# Patient Record
Sex: FEMALE | URBAN - METROPOLITAN AREA
[De-identification: names, ages, dates, MRNs, and addresses within clinical notes are randomized per-mention and may not be internally consistent; named-entity substitution may affect disease eponyms.]

---

## 2017-10-11 ENCOUNTER — APPOINTMENT (RX ONLY)
Dept: URBAN - METROPOLITAN AREA CLINIC 17 | Facility: CLINIC | Age: 52
Setting detail: DERMATOLOGY
End: 2017-10-11

## 2017-10-11 DIAGNOSIS — L81.1 CHLOASMA: ICD-10-CM

## 2017-10-11 DIAGNOSIS — B35.1 TINEA UNGUIUM: ICD-10-CM

## 2017-10-11 DIAGNOSIS — Z71.89 OTHER SPECIFIED COUNSELING: ICD-10-CM

## 2017-10-11 DIAGNOSIS — L57.0 ACTINIC KERATOSIS: ICD-10-CM

## 2017-10-11 PROBLEM — L55.1 SUNBURN OF SECOND DEGREE: Status: ACTIVE | Noted: 2017-10-11

## 2017-10-11 PROCEDURE — ? COUNSELING

## 2017-10-11 PROCEDURE — ? DEFER

## 2017-10-11 PROCEDURE — ? PRODUCT LINE (PHARMACEUTICALS)

## 2017-10-11 PROCEDURE — ? TREATMENT REGIMEN

## 2017-10-11 PROCEDURE — ? OTHER

## 2017-10-11 PROCEDURE — 99202 OFFICE O/P NEW SF 15 MIN: CPT

## 2017-10-11 ASSESSMENT — LOCATION DETAILED DESCRIPTION DERM
LOCATION DETAILED: LEFT FOREHEAD
LOCATION DETAILED: RIGHT SUPERIOR LATERAL MALAR CHEEK
LOCATION DETAILED: RIGHT DORSAL GREAT TOE
LOCATION DETAILED: RIGHT LATERAL FOREHEAD
LOCATION DETAILED: RIGHT CENTRAL MALAR CHEEK
LOCATION DETAILED: LEFT GREAT TOENAIL

## 2017-10-11 ASSESSMENT — LOCATION SIMPLE DESCRIPTION DERM
LOCATION SIMPLE: LEFT FOREHEAD
LOCATION SIMPLE: RIGHT GREAT TOE
LOCATION SIMPLE: RIGHT FOREHEAD
LOCATION SIMPLE: RIGHT CHEEK
LOCATION SIMPLE: LEFT GREAT TOE

## 2017-10-11 ASSESSMENT — LOCATION ZONE DERM
LOCATION ZONE: TOENAIL
LOCATION ZONE: TOE
LOCATION ZONE: FACE

## 2017-10-11 NOTE — HPI: INFECTION (ONYCHOMYCOSIS)
How Severe Is It?: severe
Is This A New Presentation, Or A Follow-Up?: Nail Infection
Additional History: Pt reports using lamasil in the past.

## 2017-10-11 NOTE — PROCEDURE: PRODUCT LINE (PHARMACEUTICALS)
Product 15 Price (In Dollars - Numeric Only, No Special Characters Or $): 30.00
Product 25 Units: 0
Name Of Product 1: Clobetasol Foam
Product 44 Price (In Dollars - Numeric Only, No Special Characters Or $): 0.00
Name Of Product 8: Tretinoin 0.1 % cream
Product 8 Application Directions: Apply pea size amount to entire face 2-3 times a week working up to nightly as tolerated.
Product 15 Application Directions: Apply to arms daily for 2-3 weeks
Name Of Product 15: Actinic Keratosis gel
Product 9 Application Directions: Apply to affected areas twice a day.
Product 7 Application Directions: Apply to effected areas once a day
Product 7 Price (In Dollars - Numeric Only, No Special Characters Or $): 50.00
Name Of Product 4: Tretinoin 0.025% cream
Name Of Product 12: Clobetasol Cream
Name Of Product 3: Melasma Emulsion
Name Of Product 5: Tretinoin 0.05% cream
Name Of Product 2: Rosacea triple gel
Product 6 Application Directions: Wash the scalp two to three times a week.
Product 5 Application Directions: Apply pea size amount to entire face two times a week and work up to nightly as tolerated.
Product 4 Application Directions: Apply twice weekly at night and gradually work up to nightly as tolerated
Name Of Product 9: Tacrolimus Ointment 0.1 %
Render Product Pricing In Note: Yes
Product 12 Application Directions: Apply to affected areas twice a day for 3 months.
Product 2 Application Directions: Apply to face once a day.
Name Of Product 13: Dermatitis Foam
Product 3 Units: 1
Name Of Product 6: Clobetasol shampoo
Product 13 Application Directions: Apply to affected areas twice a day for two weeks taking one week off between treatments.
Name Of Product 10: Acne Triple Gel Combination
Product 14 Application Directions: Apply to affected area twice a day for two weeks. Then reduce to 2-3 times s week as needed.
Detail Level: Zone
Name Of Product 14: Seborrheic dermatitis
Product 1 Application Directions: Apply to scalp nightly for two weeks and face 2-3 times a week. Take one week off between treatments . Use as needed for flares.
Product 10 Application Directions: Apply pea size amount to entire face twice a week and work up to nightly as tolerated.
Product 3 Application Directions: Apply to entire affected areas nightly for 12 weeks
Name Of Product 11: Tazarotene Hydrating Cream
Product 11 Application Directions: Apply pea size amount to entire face twice a week working up to nightly as tolerated.

## 2017-10-11 NOTE — PROCEDURE: OTHER
Detail Level: Detailed
Other (Free Text): Labs ordered:\\nLFT's
Note Text (......Xxx Chief Complaint.): This diagnosis correlates with the
Other (Free Text): Consider IPL or chemical peels after hydroquinone course.

## 2017-10-19 ENCOUNTER — RX ONLY (OUTPATIENT)
Age: 52
Setting detail: RX ONLY
End: 2017-10-19

## 2017-10-19 RX ORDER — TERBINAFINE HCL 250 MG
1 TABLET ORAL QD
Qty: 90 | Refills: 0 | Status: ERX | COMMUNITY
Start: 2017-10-19

## 2018-01-09 ENCOUNTER — APPOINTMENT (RX ONLY)
Dept: URBAN - METROPOLITAN AREA CLINIC 17 | Facility: CLINIC | Age: 53
Setting detail: DERMATOLOGY
End: 2018-01-09

## 2018-01-09 DIAGNOSIS — D18.0 HEMANGIOMA: ICD-10-CM

## 2018-01-09 DIAGNOSIS — L57.0 ACTINIC KERATOSIS: ICD-10-CM

## 2018-01-09 DIAGNOSIS — Z41.9 ENCOUNTER FOR PROCEDURE FOR PURPOSES OTHER THAN REMEDYING HEALTH STATE, UNSPECIFIED: ICD-10-CM

## 2018-01-09 DIAGNOSIS — B35.1 TINEA UNGUIUM: ICD-10-CM | Status: IMPROVED

## 2018-01-09 PROBLEM — D18.01 HEMANGIOMA OF SKIN AND SUBCUTANEOUS TISSUE: Status: ACTIVE | Noted: 2018-01-09

## 2018-01-09 PROCEDURE — ? PATIENT SPECIFIC COUNSELING

## 2018-01-09 PROCEDURE — 17003 DESTRUCT PREMALG LES 2-14: CPT

## 2018-01-09 PROCEDURE — ? TREATMENT REGIMEN

## 2018-01-09 PROCEDURE — 99213 OFFICE O/P EST LOW 20 MIN: CPT | Mod: 25

## 2018-01-09 PROCEDURE — ? LIQUID NITROGEN

## 2018-01-09 PROCEDURE — ? OTHER

## 2018-01-09 PROCEDURE — ? COUNSELING

## 2018-01-09 PROCEDURE — 17000 DESTRUCT PREMALG LESION: CPT

## 2018-01-09 ASSESSMENT — LOCATION SIMPLE DESCRIPTION DERM
LOCATION SIMPLE: RIGHT GREAT TOE
LOCATION SIMPLE: LEFT GREAT TOE
LOCATION SIMPLE: LEFT CHEEK
LOCATION SIMPLE: RIGHT CHEEK
LOCATION SIMPLE: RIGHT PRETIBIAL REGION

## 2018-01-09 ASSESSMENT — LOCATION ZONE DERM
LOCATION ZONE: TOENAIL
LOCATION ZONE: LEG
LOCATION ZONE: FACE

## 2018-01-09 ASSESSMENT — LOCATION DETAILED DESCRIPTION DERM
LOCATION DETAILED: RIGHT CENTRAL MALAR CHEEK
LOCATION DETAILED: LEFT INFERIOR CENTRAL MALAR CHEEK
LOCATION DETAILED: RIGHT GREAT TOENAIL
LOCATION DETAILED: RIGHT CENTRAL BUCCAL CHEEK
LOCATION DETAILED: LEFT GREAT TOENAIL
LOCATION DETAILED: RIGHT MEDIAL PROXIMAL PRETIBIAL REGION

## 2018-01-09 NOTE — PROCEDURE: OTHER
Other (Free Text): Quoted $75 for Cutera laser to one spot. Patient is aware that she way require 1-3 treatments.
Note Text (......Xxx Chief Complaint.): This diagnosis correlates with the
Detail Level: Detailed
Detail Level: Zone

## 2018-01-09 NOTE — PROCEDURE: TREATMENT REGIMEN
Continue Regimen: Lamisil cream
Detail Level: Zone
Plan: Patient was advised that the healthy nails are growing and it will take time for them to completely gown out. Another course of Lamisil was not recommended at this time

## 2018-01-09 NOTE — PROCEDURE: LIQUID NITROGEN
Render Post-Care Instructions In Note?: yes
Duration Of Freeze Thaw-Cycle (Seconds): 3
Detail Level: Detailed
Post-Care Instructions: I reviewed with the patient in detail post-care instructions. Patient is to wear sunprotection, and avoid picking at any of the treated lesions. Pt may apply Vaseline to crusted or scabbing areas.
Number Of Freeze-Thaw Cycles: 2 freeze-thaw cycles
Consent: The patient's consent was obtained including but not limited to risks of crusting, scabbing, blistering, scarring, darker or lighter pigmentary change, recurrence, incomplete removal and infection.